# Patient Record
Sex: MALE | Race: WHITE
[De-identification: names, ages, dates, MRNs, and addresses within clinical notes are randomized per-mention and may not be internally consistent; named-entity substitution may affect disease eponyms.]

---

## 2017-08-01 ENCOUNTER — HOSPITAL ENCOUNTER (EMERGENCY)
Dept: HOSPITAL 43 - DL.ED | Age: 82
Discharge: HOME | End: 2017-08-01
Payer: MEDICARE

## 2017-08-01 VITALS — SYSTOLIC BLOOD PRESSURE: 105 MMHG | DIASTOLIC BLOOD PRESSURE: 56 MMHG

## 2017-08-01 DIAGNOSIS — I35.9: ICD-10-CM

## 2017-08-01 DIAGNOSIS — J90: ICD-10-CM

## 2017-08-01 DIAGNOSIS — Z95.2: ICD-10-CM

## 2017-08-01 DIAGNOSIS — R06.00: Primary | ICD-10-CM

## 2017-08-01 DIAGNOSIS — Z79.01: ICD-10-CM

## 2017-08-01 DIAGNOSIS — I48.2: ICD-10-CM

## 2017-08-01 DIAGNOSIS — I36.1: ICD-10-CM

## 2017-08-01 DIAGNOSIS — Z91.018: ICD-10-CM

## 2017-08-01 DIAGNOSIS — Z79.899: ICD-10-CM

## 2017-08-01 DIAGNOSIS — I34.0: ICD-10-CM

## 2017-08-01 DIAGNOSIS — I42.9: ICD-10-CM

## 2017-08-01 DIAGNOSIS — I50.9: ICD-10-CM

## 2017-08-01 LAB
CHLORIDE SERPL-SCNC: 102 MMOL/L (ref 101–111)
SODIUM SERPL-SCNC: 140 MMOL/L (ref 135–145)

## 2017-08-01 PROCEDURE — 93306 TTE W/DOPPLER COMPLETE: CPT

## 2017-08-01 PROCEDURE — 71010: CPT

## 2017-08-01 PROCEDURE — 99284 EMERGENCY DEPT VISIT MOD MDM: CPT

## 2017-08-01 PROCEDURE — 85025 COMPLETE CBC W/AUTO DIFF WBC: CPT

## 2017-08-01 PROCEDURE — 99285 EMERGENCY DEPT VISIT HI MDM: CPT

## 2017-08-01 PROCEDURE — 36415 COLL VENOUS BLD VENIPUNCTURE: CPT

## 2017-08-01 PROCEDURE — 93010 ELECTROCARDIOGRAM REPORT: CPT

## 2017-08-01 PROCEDURE — 83880 ASSAY OF NATRIURETIC PEPTIDE: CPT

## 2017-08-01 PROCEDURE — 83735 ASSAY OF MAGNESIUM: CPT

## 2017-08-01 PROCEDURE — 80162 ASSAY OF DIGOXIN TOTAL: CPT

## 2017-08-01 PROCEDURE — 82553 CREATINE MB FRACTION: CPT

## 2017-08-01 PROCEDURE — 93005 ELECTROCARDIOGRAM TRACING: CPT

## 2017-08-01 PROCEDURE — 80053 COMPREHEN METABOLIC PANEL: CPT

## 2017-08-01 PROCEDURE — 82150 ASSAY OF AMYLASE: CPT

## 2017-08-01 PROCEDURE — 83690 ASSAY OF LIPASE: CPT

## 2017-08-01 PROCEDURE — 96374 THER/PROPH/DIAG INJ IV PUSH: CPT

## 2017-08-01 PROCEDURE — 85610 PROTHROMBIN TIME: CPT

## 2017-08-01 PROCEDURE — 84484 ASSAY OF TROPONIN QUANT: CPT

## 2017-08-01 NOTE — EDM.PDOC
ED HPI GENERAL MEDICAL PROBLEM





- General


Chief Complaint: Chest Pain


Stated Complaint: SOB, CHEST PAINS, [id number]


Time Seen by Provider: 08/01/17 20:25


Source of Information: Reports: Patient


History Limitations: Reports: No Limitations





- History of Present Illness


INITIAL COMMENTS - FREE TEXT/NARRATIVE: 





ED ambulatory with c/o increased SOB over past month. Admits some noncompliance 

with 3rd dose of lasix, but has been taking three times daily for past 2 days, 

Lower extremity edema improved. SOB worse when flat, with activity and 

conversation. Patient had spoken with PCP Dr. Jovani vincent and was 

told to come to ED.  No chest pain, Patient hx cardiomyopathy, chronic a fib, 

hx CHF, Aortic valve replacement. 


Onset: Gradual


Duration: Week(s): (4)


Severity: Mild


Associated Symptoms: Reports: Shortness of Breath.  Denies: Chest Pain, Cough, 

cough w sputum, Loss of Appetite, Weakness





- Related Data


 Allergies











Allergy/AdvReac Type Severity Reaction Status Date / Time


 


aspartame Allergy  Hives Verified 08/01/17 20:29


 


peanut Allergy  Hives Verified 08/01/17 20:28











Home Meds: 


 Home Meds





Carvedilol [Carvedilol] 25 mg PO BID 06/03/15 [History]


Digoxin [Digox] 125 mcg PO DAILY 06/03/15 [History]


Furosemide [Furosemide] 40 mg PO BID 06/03/15 [History]


Lisinopril [Lisinopril] 40 mg PO DAILY 06/03/15 [History]


Potassium 99 mg PO DAILY 06/03/15 [History]


Warfarin [Coumadin] 2.5 mg PO ASDIRECTED 06/03/15 [History]


amLODIPine [Norvasc] 5 mg PO DAILY 06/03/15 [History]











Past Medical History


Cardiovascular History: Reports: Heart Failure, Other (See Below)


Musculoskeletal History: Reports: Arthritis


Neurological History: Reports: Other (See Below)


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


Oncologic (Cancer) History: Reports: Other (See Below)


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





- Past Surgical History


Cardiovascular Surgical History: Reports: Other (See Below)


Musculoskeletal Surgical History: Reports: Other (See Below)





Social & Family History





- Family History


Family Medical History: Noncontributory





- Tobacco Use


Smoking Status *Q: Never Smoker


Years of Tobacco use: 50


Used Tobacco, but Quit: Yes


Month Tobacco Last Used: 75


Second Hand Smoke Exposure: No





- Caffeine Use


Caffeine Use: Reports: None





- Alcohol Use


Days Per Week of Alcohol Use: 0





- Recreational Drug Use


Recreational Drug Use: No





- Living Situation & Occupation


Living situation: Reports: , with Spouse


Occupation: Retired





ED ROS GENERAL





- Review of Systems


Review Of Systems: See Below


Constitutional: Reports: No Symptoms


HEENT: Reports: No Symptoms


Respiratory: Reports: Shortness of Breath.  Denies: Cough, Sputum


Cardiovascular: Reports: Dyspnea on Exertion, Edema, Orthopnea.  Denies: Chest 

Pain, Palpitations


Endocrine: Reports: No Symptoms


GI/Abdominal: Reports: No Symptoms


: Reports: No Symptoms


Musculoskeletal: Reports: No Symptoms


Skin: Reports: No Symptoms


Neurological: Reports: No Symptoms





ED EXAM, GENERAL





- Physical Exam


Exam: See Below


Exam Limited By: No Limitations


General Appearance: Alert, Moderate Distress


Eye Exam: Bilateral Eye: EOMI, PERRL


Ears: Normal External Exam, Hearing Loss (mild)


Nose: Normal Inspection


Throat/Mouth: Normal Inspection


Head: Atraumatic


Neck: Limited Range of Motion


Respiratory/Chest: Decreased Breath Sounds (left)


Cardiovascular: Normal Peripheral Pulses, Irregularly Irregular, Other (

swooshing murmur).  No: No Edema (2+ to knees)


GI/Abdominal: Normal Bowel Sounds, Soft


Back Exam: Normal Inspection


Extremities: Pedal Edema


Neurological: Alert, Oriented, Normal Cognition


Psychiatric: Normal Affect


Skin Exam: Warm, Dry, Intact, Normal Color





Course





- Vital Signs


Last Recorded V/S: 


 Last Vital Signs











Temp  98.2 F   08/01/17 21:42


 


Pulse  68   08/01/17 21:42


 


Resp  20   08/01/17 21:42


 


BP  105/56 L  08/01/17 21:42


 


Pulse Ox  93 L  08/01/17 21:42














- Orders/Labs/Meds


Orders: 


 Active Orders 24 hr











 Category Date Time Status


 


 EKG 12 Lead [EKG Documentation Completion] [RC] URGENT Care  08/01/17 20:11 

Active











Labs: 


 Laboratory Tests











  08/01/17 08/01/17 08/01/17 Range/Units





  20:18 20:18 20:18 


 


WBC  9.0    (5.0-10.0)  10^3/uL


 


RBC  4.86    (4.6-6.2)  10^6/uL


 


Hgb  15.7    (14.0-18.0)  g/dL


 


Hct  45.9    (40.0-54.0)  %


 


MCV  94.4    ()  fL


 


MCH  32.3    (27.0-34.0)  pg


 


MCHC  34.2    (33.0-35.0)  g/dL


 


Plt Count  173    (150-450)  10^3/uL


 


Neut % (Auto)  63.9    (42.2-75.2)  %


 


Lymph % (Auto)  19.0 L    (20.5-50.1)  %


 


Mono % (Auto)  12.1 H    (2-8)  %


 


Eos % (Auto)  4.2 H    (1.0-3.0)  %


 


Baso % (Auto)  0.8    (0.0-1.0)  %


 


PT     (9.0-12.0)  SEC


 


INR     (0.9-1.2)  


 


Sodium   140   (135-145)  mmol/L


 


Potassium   4.0   (3.6-5.0)  mmol/L


 


Chloride   102   (101-111)  mmol/L


 


Carbon Dioxide   29.0   (21.0-31.0)  mmol/L


 


Anion Gap   13.0   


 


BUN   17   (7-18)  mg/dL


 


Creatinine   0.9   (0.6-1.3)  mg/dL


 


Est Cr Clr Drug Dosing   66.55   mL/min


 


Estimated GFR (MDRD)   > 60   


 


BUN/Creatinine Ratio   18.88   


 


Glucose   105   ()  mg/dL


 


Calcium   8.0 L   (8.4-10.2)  mg/dl


 


Magnesium   2.0   (1.8-2.5)  mg/dL


 


Total Bilirubin   0.7   (0.2-1.0)  mg/dL


 


AST   39   (10-42)  IU/L


 


ALT   29   (10-60)  IU/L


 


Alkaline Phosphatase   111   ()  IU/L


 


CK-MB (CK-2)    2.60  (0.4-4.7)  ng/mL


 


Troponin I   0.02   (0.00-0.02)  ng/ml


 


B-Natriuretic Peptide   194 H   (0-100)  pg/ml


 


Total Protein   4.9 L   (6.7-8.2)  g/dl


 


Albumin   2.6 L   (3.2-5.5)  g/dl


 


Globulin   2.3   


 


Albumin/Globulin Ratio   1.13   


 


Amylase   68   ()  U/L


 


Lipase   36   (22-51)  U/L


 


Digoxin     (0-2.5)  ng/ml














  08/01/17 08/01/17 Range/Units





  20:18 20:18 


 


WBC    (5.0-10.0)  10^3/uL


 


RBC    (4.6-6.2)  10^6/uL


 


Hgb    (14.0-18.0)  g/dL


 


Hct    (40.0-54.0)  %


 


MCV    ()  fL


 


MCH    (27.0-34.0)  pg


 


MCHC    (33.0-35.0)  g/dL


 


Plt Count    (150-450)  10^3/uL


 


Neut % (Auto)    (42.2-75.2)  %


 


Lymph % (Auto)    (20.5-50.1)  %


 


Mono % (Auto)    (2-8)  %


 


Eos % (Auto)    (1.0-3.0)  %


 


Baso % (Auto)    (0.0-1.0)  %


 


PT  21.5 H   (9.0-12.0)  SEC


 


INR  2.1 H   (0.9-1.2)  


 


Sodium    (135-145)  mmol/L


 


Potassium    (3.6-5.0)  mmol/L


 


Chloride    (101-111)  mmol/L


 


Carbon Dioxide    (21.0-31.0)  mmol/L


 


Anion Gap    


 


BUN    (7-18)  mg/dL


 


Creatinine    (0.6-1.3)  mg/dL


 


Est Cr Clr Drug Dosing    mL/min


 


Estimated GFR (MDRD)    


 


BUN/Creatinine Ratio    


 


Glucose    ()  mg/dL


 


Calcium    (8.4-10.2)  mg/dl


 


Magnesium    (1.8-2.5)  mg/dL


 


Total Bilirubin    (0.2-1.0)  mg/dL


 


AST    (10-42)  IU/L


 


ALT    (10-60)  IU/L


 


Alkaline Phosphatase    ()  IU/L


 


CK-MB (CK-2)    (0.4-4.7)  ng/mL


 


Troponin I    (0.00-0.02)  ng/ml


 


B-Natriuretic Peptide    (0-100)  pg/ml


 


Total Protein    (6.7-8.2)  g/dl


 


Albumin    (3.2-5.5)  g/dl


 


Globulin    


 


Albumin/Globulin Ratio    


 


Amylase    ()  U/L


 


Lipase    (22-51)  U/L


 


Digoxin   0.6  (0-2.5)  ng/ml











Meds: 


Medications














Discontinued Medications














Generic Name Dose Route Start Last Admin





  Trade Name Phoebe  PRN Reason Stop Dose Admin


 


Furosemide  40 mg  08/01/17 20:53  08/01/17 21:00





  Lasix  IVPUSH  08/01/17 20:54  40 mg





  NOW ONE   Administration














- Re-Assessments/Exams


Free Text/Narrative Re-Assessment/Exam: 








Symptoms improved following Lasix IV and 1500ml diuresis. Talking full 

sentences without noted obvious dyspnea.  Discussed potential admission or 

transfer with patient for further management of pulmonary edema. Patient 

refused admission or transfer. Agreeable to follow with PCP in am.


Wife present during recommendations.  





Departure





- Departure


Time of Disposition: 22:35


Disposition: Home, Self-Care 01


Condition: Fair


Clinical Impression: 


 Dyspnea, Cardiomyopathy, Chronic atrial fibrillation, Pleural effusion





Forms:  ED Department Discharge


Additional Instructions: 


Take lasix as directed three times daily


follow up with Dr Awad's office in am and with Dr. Hahn


Urgent return to ER if increased difficulty breathing or chest pain








- My Orders


Last 24 Hours: 


My Active Orders





08/01/17 20:11


EKG 12 Lead [EKG Documentation Completion] [RC] URGENT 














- Assessment/Plan


Last 24 Hours: 


My Active Orders





08/01/17 20:11


EKG 12 Lead [EKG Documentation Completion] [RC] URGENT

## 2017-08-09 NOTE — EKG
08/01/2017- CHANTELLE RICO -

 

EKG per my reading shows atrial fibrillation with inferior Q-waves.

 

DD:  08/08/2017 19:41:59

DT:  08/08/2017 19:48:11

[facility]

Job #:  [id number]

## 2017-12-14 ENCOUNTER — HOSPITAL ENCOUNTER (INPATIENT)
Dept: HOSPITAL 43 - DL.ED | Age: 82
LOS: 3 days | Discharge: HOME | DRG: 292 | End: 2017-12-17
Attending: FAMILY MEDICINE | Admitting: FAMILY MEDICINE
Payer: MEDICARE

## 2017-12-14 DIAGNOSIS — I48.0: ICD-10-CM

## 2017-12-14 DIAGNOSIS — I35.9: ICD-10-CM

## 2017-12-14 DIAGNOSIS — I42.9: ICD-10-CM

## 2017-12-14 DIAGNOSIS — Z79.01: ICD-10-CM

## 2017-12-14 DIAGNOSIS — G47.33: ICD-10-CM

## 2017-12-14 DIAGNOSIS — Z79.899: ICD-10-CM

## 2017-12-14 DIAGNOSIS — Z95.2: ICD-10-CM

## 2017-12-14 DIAGNOSIS — I50.23: ICD-10-CM

## 2017-12-14 DIAGNOSIS — D49.6: ICD-10-CM

## 2017-12-14 DIAGNOSIS — I10: ICD-10-CM

## 2017-12-14 DIAGNOSIS — E88.09: ICD-10-CM

## 2017-12-14 DIAGNOSIS — I50.9: Primary | ICD-10-CM

## 2017-12-14 DIAGNOSIS — I48.91: ICD-10-CM

## 2017-12-14 DIAGNOSIS — D69.6: ICD-10-CM

## 2017-12-14 DIAGNOSIS — J90: ICD-10-CM

## 2017-12-14 DIAGNOSIS — E78.5: ICD-10-CM

## 2017-12-14 LAB
CHLORIDE SERPL-SCNC: 105 MMOL/L (ref 101–111)
SODIUM SERPL-SCNC: 139 MMOL/L (ref 135–145)

## 2017-12-14 PROCEDURE — 96374 THER/PROPH/DIAG INJ IV PUSH: CPT

## 2017-12-14 PROCEDURE — 85025 COMPLETE CBC W/AUTO DIFF WBC: CPT

## 2017-12-14 PROCEDURE — 36415 COLL VENOUS BLD VENIPUNCTURE: CPT

## 2017-12-14 PROCEDURE — 84484 ASSAY OF TROPONIN QUANT: CPT

## 2017-12-14 PROCEDURE — 96376 TX/PRO/DX INJ SAME DRUG ADON: CPT

## 2017-12-14 PROCEDURE — P9047 ALBUMIN (HUMAN), 25%, 50ML: HCPCS

## 2017-12-14 PROCEDURE — 99285 EMERGENCY DEPT VISIT HI MDM: CPT

## 2017-12-14 PROCEDURE — 80162 ASSAY OF DIGOXIN TOTAL: CPT

## 2017-12-14 PROCEDURE — 83880 ASSAY OF NATRIURETIC PEPTIDE: CPT

## 2017-12-14 PROCEDURE — 74000: CPT

## 2017-12-14 PROCEDURE — 71020: CPT

## 2017-12-14 PROCEDURE — 85610 PROTHROMBIN TIME: CPT

## 2017-12-14 PROCEDURE — 93010 ELECTROCARDIOGRAM REPORT: CPT

## 2017-12-14 PROCEDURE — 93005 ELECTROCARDIOGRAM TRACING: CPT

## 2017-12-14 PROCEDURE — 80053 COMPREHEN METABOLIC PANEL: CPT

## 2017-12-14 RX ADMIN — ALBUMIN HUMAN SCH MLS/HR: 0.25 SOLUTION INTRAVENOUS at 23:02

## 2017-12-14 NOTE — PCM.HP
H&P History of Present Illness





- General


Date of Service: 12/14/17


Admit Problem/Dx: 


 Admission Diagnosis/Problem





Admission Diagnosis/Problem      Acute congestive heart failure











- History of Present Illness


Initial Comments - Free Text/Narative: 


82-year-old male with past medical history of systolic congestive heart failure

, paroxysmal atrial fibrillation, obstructive sleep apnea, hypertension, 

hyperlipidemia, aortic valve disease, cerebral artery occlusion with cerebral 

infarction, thrombocytopenia present to the emergency room with complaint of 

shortness breath, lower extremities/scrotum/abdominal edema started 3-4 weeks 

ago and getting worse. Patient stated that he will get very winded if he climbs 

one flight of stairs also if he pains over to tie his shoes or walk fast to the 

bathroom. He denies chest pain, palpitation, heart racing, cough, hemoptysis, 

upper spirits symptoms, abdominal pain, diarrhea, constipation, blood in stool, 

black stool, urinary symptoms, any other symptoms or concerns. Patient stated 

that he had similar symptoms last August and his Lasix was increased to 40 mg 3 

times a day.





In emergency room laboratory workup reported unremarkable CBC except for 

platelet of 139 and INR 2.0. Troponin 0.03. . Sodium 139. Potassium 3.8. 

Creatinine 0.9. Total bilirubin 1.2. AST 40. AST 27. Alkaline phosphatase 103. 

Total protein 5.2. Albumin 2.6. Chest x-ray reported mild interstitial edema. 

Small left pleural effusion. Left lower lobe atelectasis versus consolidation. 

Abdominal x-ray reported no obstruction or free air. EKG shows atrial 

fibrillation with heart rate of 81. Patient was given 80 mg of Lasix in the 

emergency room.








- Related Data


Allergies/Adverse Reactions: 


 Allergies











Allergy/AdvReac Type Severity Reaction Status Date / Time


 


aspartame Allergy  Hives Verified 12/14/17 22:09


 


peanut Allergy  Hives Verified 12/14/17 22:09











Home Medications: 


 Home Meds





Carvedilol [Carvedilol] 25 mg PO BID 06/03/15 [History]


Digoxin [Digox] 125 mcg PO DAILY 06/03/15 [History]


Furosemide [Furosemide] 40 mg PO TID 06/03/15 [History]


Lisinopril [Lisinopril] 40 mg PO DAILY 06/03/15 [History]


Methylcellulose (with Sugar) [Citrucel] 454 gm PO DAILY 12/14/17 [History]


Potassium Chloride [Klor-Con M20] 20 meq PO DAILY 12/14/17 [History]


Warfarin [Coumadin] 2.5 mg PO ASDIRECTED 12/14/17 [History]


Warfarin [Coumadin] 5 mg PO ASDIRECTED 12/14/17 [History]











Past Medical History


Cardiovascular History: Reports: Afib, Heart Failure


Musculoskeletal History: Reports: Arthritis


Neurological History: Reports: Other (See Below)


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


Oncologic (Cancer) History: Reports: Other (See Below)


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





- Past Surgical History


Cardiovascular Surgical History: Reports: Valve Replacement





Social & Family History





- Family History


Family Medical History: Noncontributory





- Tobacco Use


Smoking Status *Q: Never Smoker


Years of Tobacco use: 50


Used Tobacco, but Quit: Yes


Month Tobacco Last Used: 75


Second Hand Smoke Exposure: No





- Caffeine Use


Caffeine Use: Reports: None





- Alcohol Use


Days Per Week of Alcohol Use: 0





- Recreational Drug Use


Recreational Drug Use: No





- Living Situation & Occupation


Living situation: Reports: , with Spouse


Occupation: Retired





H&P Review of Systems





- Review of Systems:


Review Of Systems: ROS reveals no pertinent complaints other than HPI.





Exam





- Exam


Exam: See Below





- Vital Signs


Vital Signs: 


 Last Vital Signs











Temp  36.6 C   12/14/17 22:18


 


Pulse  64   12/14/17 22:18


 


Resp  20   12/14/17 22:18


 


BP  105/59 L  12/14/17 22:18


 


Pulse Ox  93 L  12/14/17 22:18











Weight: 92.986 kg





- Exam


General: Alert, Oriented, Cooperative, Mild Distress.  No: Moderate Distress, 

Severe Distress, Sedated, Lethargic, Obtunded


HEENT: Conjunctiva Clear, EACs Clear, EOMI, Hearing Intact, Mucosa Moist & Pink

, Nares Patent, Normal Nasal Septum, Posterior Pharynx Clear, Pupils Equal, 

Pupils Reactive, TMs Clear


Neck: Supple, Trachea Midline


Lungs: Decreased Breath Sounds (Mostly basis), Crackles (In basis).  No: Rhonchi

, Rub, Stridor, Wheezing


Cardiovascular: Normal S1, Normal S2, Irregular Rhythm


GI/Abdominal Exam: Normal Bowel Sounds, Soft, Non-Tender, No Organomegaly, No 

Abnormal Bruit, No Mass, Distended.  No: Guarding, Rigid, Rebound, Tender, 

Hepatomegaly, Splenomegaly


 (Male) Exam: Deferred


Rectal (Males) Exam: Deferred


Back Exam: Normal Inspection, Full Range of Motion.  No: CVA Tenderness (L), 

CVA Tenderness (R)


Extremities: Normal Range of Motion, Non-Tender, Normal Capillary Refill, Other 

(+3 bilateral lower extremities edema from ankle to upper thigh.)


Peripheral Pulses: 2+: Radial (L), Radial (R)


Skin: Dry, Intact.  No: Petechia


Neurological: Cranial Nerves Intact, Reflexes Equal Bilateral


Neuro Extensive - Mental Status: Alert, Oriented x3, Normal Cognition


Psychiatric: Alert, Normal Affect, Normal Mood





- Patient Data


Result Diagrams: 


 12/14/17 19:50





 12/14/17 19:50





*Q Meaningful Use (ADM)





- VTE *Q


VTE Criteria *Q: 








- Stroke *Q


Stroke Criteria *Q: 








- AMI *Q


AMI Criteria *Q: 








- Problem List


(1) Acute on chronic systolic (congestive) heart failure


SNOMED Code(s): 499202978


   ICD Code: I50.23 - ACUTE ON CHRONIC SYSTOLIC (CONGESTIVE) HEART FAILURE   

Status: Acute   Priority: High   Current Visit: Yes   





(2) Hypoalbuminemia


SNOMED Code(s): 324039641


   ICD Code: E88.09 - OTH DISORDERS OF PLASMA-PROTEIN METABOLISM, NEC   Status: 

Chronic   Current Visit: Yes   





(3) Thrombocytopenia


SNOMED Code(s): 695425850


   ICD Code: D69.6 - THROMBOCYTOPENIA, UNSPECIFIED   Status: Chronic   Current 

Visit: Yes   





(4) Essential hypertension


SNOMED Code(s): 19241698


   ICD Code: I10 - ESSENTIAL (PRIMARY) HYPERTENSION   Status: Chronic   Current 

Visit: Yes   





(5) Aortic valve disease


SNOMED Code(s): 9734906


   ICD Code: I35.9 - NONRHEUMATIC AORTIC VALVE DISORDER, UNSPECIFIED   Status: 

Chronic   Current Visit: Yes   





(6) Chronic atrial fibrillation


SNOMED Code(s): 643575484


   ICD Code: I48.2 - CHRONIC ATRIAL FIBRILLATION   Status: Chronic   Current 

Visit: Yes   





(7) Pleural effusion


SNOMED Code(s): 01090234


   ICD Code: J90 - PLEURAL EFFUSION, NOT ELSEWHERE CLASSIFIED   Status: Chronic

   Current Visit: Yes   


Problem List Initiated/Reviewed/Updated: Yes


Orders Last 24hrs: 


 Active Orders 24 hr











 Category Date Time Status


 


 Patient Status [ADT] Routine ADT  12/14/17 22:17 Active


 


 Antiembolic Devices [RC] PER UNIT ROUTINE Care  12/14/17 22:22 Ordered


 


 Bedrest Bathroom Privileges [RC] ASDIRECTED Care  12/14/17 22:16 Ordered


 


 Cardiac Monitoring [RC] CONTINUOUS Care  12/14/17 22:21 Ordered


 


 Height and Weight [RC] DAILY Care  12/14/17 22:16 Ordered


 


 Intake and Output [RC] Q6H Care  12/14/17 22:20 Ordered


 


 Notify Provider Vital Signs [RC] ASDIRECTED Care  12/14/17 22:21 Ordered


 


 Oxygen Therapy [RC] PRN Care  12/14/17 22:17 Ordered


 


 VTE/DVT Education [RC] PER UNIT ROUTINE Care  12/14/17 22:17 Ordered


 


 Vital Signs [RC] Q4H Care  12/14/17 22:17 Ordered


 


 2 Gram Sodium Diet [DIET] Diet  12/14/17 Breakfast Active


 


 CBC WITH AUTO DIFF [HEME] AM Lab  12/15/17 05:11 Ordered


 


 COMPREHENSIVE METABOLIC PN,CMP [CHEM] AM Lab  12/15/17 05:11 Ordered


 


 INR,PT,PROTHROMBIN TIME [COAG] AM Lab  12/15/17 05:11 Ordered


 


 MAGNESIUM [CHEM] AM Lab  12/15/17 05:11 Ordered


 


 TROPONIN I [CHEM] AM Lab  12/15/17 05:11 Ordered


 


 Acetaminophen [Tylenol] Med  12/14/17 22:16 Ordered





 650 mg PO Q4H PRN   


 


 Albumin 25% [Flexbumin 25%] Med  12/14/17 22:15 Ordered





 12.5 gm in 50 ml IV Q8H   


 


 Carvedilol [Coreg] Med  12/15/17 09:00 Ordered





 25 mg PO BID   


 


 Digoxin [Lanoxin] Med  12/15/17 09:00 Ordered





 125 mcg PO DAILY   


 


 Furosemide [Lasix] Med  12/15/17 06:00 Ordered





 40 mg IVPUSH Q8H   


 


 Lisinopril [Lisinopril] Med  12/15/17 09:00 Ordered





 40 mg PO DAILY   


 


 Methylcellulose (with Sugar) [Citrucel] Med  12/15/17 09:00 Ordered





 454 gm PO DAILY   


 


 Ondansetron [Zofran] Med  12/14/17 22:16 Ordered





 4 mg IVPUSH Q6H PRN   


 


 Polyethylene Glycol 3350 [MiraLAX] Med  12/14/17 22:16 Ordered





 17 gm PO DAILY PRN   


 


 Potassium Chloride [Klor-Con 10] Med  12/15/17 06:00 Ordered





 10 meq PO TIDMEALS   


 


 Sodium Chloride 0.9% [Saline Flush] Med  12/14/17 22:16 Ordered





 10 ml FLUSH ASDIRECTED PRN   


 


 Warfarin Pharmacy to Dose [Pharmacy to Dose - Warfarin] Med  12/14/17 22:30 

Ordered





 1 dose .XX ASDIRECTED   


 


 Warfarin [Coumadin] Med  12/14/17 22:15 Ordered





 2.5 mg PO ASDIRECTED   


 


 Warfarin [Coumadin] Med  12/14/17 22:15 Ordered





 5 mg PO ASDIRECTED   


 


 Antiembolic Hose [OM.PC] Per Unit Routine Oth  12/14/17 22:21 Ordered


 


 Saline Lock Insert [OM.PC] Routine Oth  12/14/17 22:16 Ordered


 


 Resuscitation Status Routine Resus Stat  12/14/17 22:16 Ordered








 Medication Orders





Carvedilol (Coreg)  25 mg PO BID JOHNNIE


Digoxin (Lanoxin)  125 mcg PO DAILY JOHNNIE


Furosemide (Lasix)  40 mg IVPUSH Q8H JOHNNIE


Albumin Human (Flexbumin 25%)  12.5 gm in 50 mls @ 100 mls/hr IV Q8H JOHNNIE


   Stop: 12/16/17 22:16


Non-Formulary Medication (Lisinopril [Lisinopril])  40 mg PO DAILY [facility]


Non-Formulary Medication (Methylcellulose (With Sugar) [Citrucel])  454 gm PO 

DAILY JOHNNIE


Potassium Chloride (Klor-Con 10)  10 meq PO TIDMEALS JOHNNIE


Warfarin Sodium (Coumadin)  2.5 mg PO ASDIRECTED JOHNNIE


Warfarin Sodium (Coumadin)  5 mg PO ASDIRECTED JOHNNIE


Warfarin Sodium (Pharmacy To Dose - Warfarin)  1 dose .XX ASDIRECTED JOHNNIE








Assessment/Plan Comment:: 





Impression


82-year-old female with a history of congestive heart failure, atrial 

fibrillation presented with anasarca, possible ascites, pulmonary edema. His 

albumin is low. I reviewed his echocardiogram report from 8/1/17 Which Reported 

Ejection Fraction 45-50% with Mild Global Hypokinesia.





Plan


Patient received 80 mg of Lasix IV in the emergency room and he gave good urine 

output


-Lasix 40 mg IV every 8 hours


-Potassium chloride 10 mEq every 8 hours while on IV potassium


-Albumin 25% every 8 hours for 2 days


-We'll consider starting spironolactone after his acute event improved


-I and os and daily weight


-Continue digoxin, lisinopril, carvedilol


-Pharmacy is consulted for warfarin dosing


-Repeat labs in the morning





Patient is on warfarin and INR is therapeutic so no need for further DVT 

prophylaxis





Patient wants to be full code





Plan of care was discussed with patient and he verbalized understanding agreed 

with it.

## 2017-12-14 NOTE — EDM.PDOC
ED HPI GENERAL MEDICAL PROBLEM





- General


Chief Complaint: Cardiovascular Problem


Stated Complaint: [id number] EDEMA TOLD BY DR CASTRO TO COME


Time Seen by Provider: 12/14/17 19:40


Source of Information: Reports: Patient, Family


History Limitations: Reports: No Limitations





- History of Present Illness


INITIAL COMMENTS - FREE TEXT/NARRATIVE: 





ED with c/o gradual increasing SOB and weight gain. Notes baseline is 200, 

today 207#. No chest pain. Similar sx in august and IV lasix improved symptoms, 

Stated he was recommended to stay but did not. Activity tolerance decreased. 

Unable to bend over and tie shoes as feels abdomen also full of fluid and 

"pushing everything up when bends over. Edema to legs increasing, now up above 

knees. Hx atrial fib. 


Onset: Gradual





- Related Data


 Allergies











Allergy/AdvReac Type Severity Reaction Status Date / Time


 


aspartame Allergy  Hives Verified 12/14/17 19:30


 


peanut Allergy  Hives Verified 12/14/17 19:30











Home Meds: 


 Home Meds





Carvedilol [Carvedilol] 25 mg PO BID 06/03/15 [History]


Digoxin [Digox] 125 mcg PO DAILY 06/03/15 [History]


Furosemide [Furosemide] 40 mg PO TID 06/03/15 [History]


Lisinopril [Lisinopril] 40 mg PO DAILY 06/03/15 [History]


Warfarin [Coumadin] 2.5 mg PO ASDIRECTED 06/03/15 [History]


Potassium Chloride [Klor-Con M20] 20 meq PO DAILY 12/14/17 [History]











Past Medical History


Cardiovascular History: Reports: Afib, Heart Failure


Musculoskeletal History: Reports: Arthritis


Neurological History: Reports: Other (See Below)


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


Oncologic (Cancer) History: Reports: Other (See Below)


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





- Past Surgical History


Cardiovascular Surgical History: Reports: Valve Replacement





Social & Family History





- Family History


Family Medical History: Noncontributory





- Tobacco Use


Smoking Status *Q: Never Smoker


Years of Tobacco use: 50


Used Tobacco, but Quit: Yes


Month Tobacco Last Used: 75


Second Hand Smoke Exposure: No





- Caffeine Use


Caffeine Use: Reports: None





- Alcohol Use


Days Per Week of Alcohol Use: 0





- Recreational Drug Use


Recreational Drug Use: No





- Living Situation & Occupation


Living situation: Reports: , with Spouse


Occupation: Retired





ED ROS GENERAL





- Review of Systems


Review Of Systems: See Below


Constitutional: Reports: No Symptoms


HEENT: Reports: No Symptoms


Respiratory: Reports: Shortness of Breath


Cardiovascular: Reports: Edema.  Denies: Chest Pain


GI/Abdominal: Reports: Diarrhea, Distension


: Reports: No Symptoms


Musculoskeletal: Reports: No Symptoms


Skin: Reports: No Symptoms


Neurological: Reports: No Symptoms





ED EXAM, GENERAL





- Physical Exam


Exam: See Below


Exam Limited By: No Limitations


General Appearance: Alert, Mild Distress (dypnea with ambulation, minimal at 

rest, talking full sentences)


Eye Exam: Bilateral Eye: EOMI


Ears: Normal External Exam, Normal TMs


Nose: Normal Inspection


Throat/Mouth: Normal Inspection


Head: Atraumatic, Normocephalic


Neck: Normal Inspection


Respiratory/Chest: Decreased Breath Sounds, Crackles (fine left base).  No: 

Rhonchi, Wheezing


Cardiovascular: Normal Peripheral Pulses, Diastolic Murmur.  No: Regular Rate, 

Rhythm, No Edema (3+to knees bilaterally)


GI/Abdominal: Normal Bowel Sounds, Soft, Distended.  No: Tender


Back Exam: Normal Inspection


Extremities: Normal Range of Motion


Neurological: Alert, Oriented, Normal Cognition


Skin Exam: Warm, Dry, Intact, Normal Color





Course





- Vital Signs


Last Recorded V/S: 


 Last Vital Signs











Temp  95.7 F   12/14/17 19:25


 


Pulse  74   12/14/17 19:25


 


Resp  18   12/14/17 19:25


 


BP  145/89 H  12/14/17 19:25


 


Pulse Ox  93 L  12/14/17 19:25














- Orders/Labs/Meds


Orders: 


 Active Orders 24 hr











 Category Date Time Status


 


 EKG 12 Lead [EKG Documentation Completion] [RC] URGENT Care  12/14/17 20:22 

Active


 


 DIGOXIN [CHEM] Stat Lab  12/14/17 19:50 Received


 


 Furosemide [Lasix] Med  12/14/17 21:32 Once





 40 mg IVPUSH NOW ONE   











Labs: 


 Laboratory Tests











  12/14/17 12/14/17 12/14/17 Range/Units





  19:50 19:50 19:50 


 


WBC  7.9    (5.0-10.0)  10^3/uL


 


RBC  4.70    (4.6-6.2)  10^6/uL


 


Hgb  15.3    (14.0-18.0)  g/dL


 


Hct  45.2    (40.0-54.0)  %


 


MCV  96.2    ()  fL


 


MCH  32.6    (27.0-34.0)  pg


 


MCHC  33.8    (33.0-35.0)  g/dL


 


Plt Count  139 L    (150-450)  10^3/uL


 


Neut % (Auto)  65.0    (42.2-75.2)  %


 


Lymph % (Auto)  19.1 L    (20.5-50.1)  %


 


Mono % (Auto)  11.3 H    (2-8)  %


 


Eos % (Auto)  4.0 H    (1.0-3.0)  %


 


Baso % (Auto)  0.6    (0.0-1.0)  %


 


PT    20.6 H  (9.0-12.0)  SEC


 


INR    2.0 H  (0.9-1.2)  


 


Sodium   139   (135-145)  mmol/L


 


Potassium   3.8   (3.6-5.0)  mmol/L


 


Chloride   105   (101-111)  mmol/L


 


Carbon Dioxide   28.0   (21.0-31.0)  mmol/L


 


Anion Gap   9.8   


 


BUN   22 H   (7-18)  mg/dL


 


Creatinine   0.9   (0.6-1.3)  mg/dL


 


Est Cr Clr Drug Dosing   65.46   mL/min


 


Estimated GFR (MDRD)   > 60   


 


BUN/Creatinine Ratio   24.44   


 


Glucose   137 H   ()  mg/dL


 


Calcium   7.8 L   (8.4-10.2)  mg/dl


 


Total Bilirubin   1.2 H   (0.2-1.0)  mg/dL


 


AST   40   (10-42)  IU/L


 


ALT   27   (10-60)  IU/L


 


Alkaline Phosphatase   103   ()  IU/L


 


Troponin I   0.03 H*   (0.00-0.02)  ng/ml


 


B-Natriuretic Peptide   263 H   (0-100)  pg/ml


 


Total Protein   5.2 L   (6.7-8.2)  g/dl


 


Albumin   2.6 L   (3.2-5.5)  g/dl


 


Globulin   2.6   


 


Albumin/Globulin Ratio   1.00   











Meds: 


Medications














Discontinued Medications














Generic Name Dose Route Start Last Admin





  Trade Name Freq  PRN Reason Stop Dose Admin


 


Furosemide  40 mg  12/14/17 20:21  12/14/17 20:31





  Lasix  IVPUSH  12/14/17 20:22  40 mg





  NOW ONE   Administration














- Radiology Interpretation


Free Text/Narrative:: 





CXR, Left pleural effusion





- Re-Assessments/Exams


Free Text/Narrative Re-Assessment/Exam: 





12/14/17 21:34


TC Dr. Dial, agree to admit for further eval and management CHF





Departure





- Departure


Time of Disposition: 21:34


Disposition: Admitted As Inpatient 68


Condition: Good


Clinical Impression: 


 CHF, Congestive heart failure, Chronic atrial fibrillation, Pleural effusion





Cardiomyopathy


Qualifiers:


 Cardiomyopathy type: unspecified Qualified Code(s): I42.9 - Cardiomyopathy, 

unspecified





Dyspnea


Qualifiers:


 Dyspnea type: dyspnea on exertion Qualified Code(s): R06.09 - Other forms of 

dyspnea





Forms:  ED Department Discharge





- My Orders


Last 24 Hours: 


My Active Orders





12/14/17 19:50


DIGOXIN [CHEM] Stat 





12/14/17 20:22


EKG 12 Lead [EKG Documentation Completion] [RC] URGENT 





12/14/17 21:32


Furosemide [Lasix]   40 mg IVPUSH NOW ONE 














- Assessment/Plan


Last 24 Hours: 


My Active Orders





12/14/17 19:50


DIGOXIN [CHEM] Stat 





12/14/17 20:22


EKG 12 Lead [EKG Documentation Completion] [RC] URGENT 





12/14/17 21:32


Furosemide [Lasix]   40 mg IVPUSH NOW ONE

## 2017-12-15 LAB
CHLORIDE SERPL-SCNC: 106 MMOL/L (ref 101–111)
SODIUM SERPL-SCNC: 140 MMOL/L (ref 135–145)

## 2017-12-15 RX ADMIN — POTASSIUM CHLORIDE SCH MEQ: 750 TABLET, FILM COATED, EXTENDED RELEASE ORAL at 10:26

## 2017-12-15 RX ADMIN — ALBUMIN HUMAN SCH MLS/HR: 0.25 SOLUTION INTRAVENOUS at 14:17

## 2017-12-15 RX ADMIN — POTASSIUM CHLORIDE SCH: 750 TABLET, FILM COATED, EXTENDED RELEASE ORAL at 06:02

## 2017-12-15 RX ADMIN — POTASSIUM CHLORIDE SCH MEQ: 750 TABLET, FILM COATED, EXTENDED RELEASE ORAL at 14:13

## 2017-12-15 RX ADMIN — ALBUMIN HUMAN SCH MLS/HR: 0.25 SOLUTION INTRAVENOUS at 22:15

## 2017-12-15 RX ADMIN — ALBUMIN HUMAN SCH MLS/HR: 0.25 SOLUTION INTRAVENOUS at 06:17

## 2017-12-15 RX ADMIN — POTASSIUM CHLORIDE SCH MEQ: 750 TABLET, FILM COATED, EXTENDED RELEASE ORAL at 17:49

## 2017-12-15 NOTE — PCM.PN
- General Info


Date of Service: 12/15/17


Admission Dx/Problem (Free Text): 


 Admission Diagnosis/Problem





Admission Diagnosis/Problem      Acute congestive heart failure








Subjective Update: 





Patient stated that he is feeling better. His lower extremities edema and 

abdominal edema improved. His shortness breath improved. He denies chest pain, 

cough, palpitation, nausea, vomiting, fever, chills, any other symptoms or 

concerns.





- Patient Data


Vitals - Most Recent: 


 Last Vital Signs











Temp  36.9 C   12/15/17 07:00


 


Pulse  71   12/15/17 10:26


 


Resp  20   12/15/17 07:00


 


BP  119/68   12/15/17 10:27


 


Pulse Ox  94 L  12/15/17 07:00











Weight - Most Recent: 91.172 kg


I&O - Last 24 Hours: 


 Intake & Output











 12/14/17 12/15/17 12/15/17





 22:59 06:59 14:59


 


Intake Total 100 152 700


 


Output Total 5978 039 9310


 


Balance -1000 -023 300











Lab Results Last 24 Hours: 


 Laboratory Results - last 24 hr











  12/15/17 12/15/17 12/15/17 Range/Units





  05:45 05:45 05:45 


 


WBC  7.7    (5.0-10.0)  10^3/uL


 


RBC  4.40 L    (4.6-6.2)  10^6/uL


 


Hgb  14.4    (14.0-18.0)  g/dL


 


Hct  42.6    (40.0-54.0)  %


 


MCV  96.8    ()  fL


 


MCH  32.7    (27.0-34.0)  pg


 


MCHC  33.8    (33.0-35.0)  g/dL


 


Plt Count  134 L    (150-450)  10^3/uL


 


Neut % (Auto)  60.1    (42.2-75.2)  %


 


Lymph % (Auto)  21.8    (20.5-50.1)  %


 


Mono % (Auto)  13.3 H    (2-8)  %


 


Eos % (Auto)  4.3 H    (1.0-3.0)  %


 


Baso % (Auto)  0.5    (0.0-1.0)  %


 


PT   19.9 H   (9.0-12.0)  SEC


 


INR   2.0 H   (0.9-1.2)  


 


Sodium    140  (135-145)  mmol/L


 


Potassium    3.4 L  (3.6-5.0)  mmol/L


 


Chloride    106  (101-111)  mmol/L


 


Carbon Dioxide    29.0  (21.0-31.0)  mmol/L


 


Anion Gap    8.4  


 


BUN    22 H  (7-18)  mg/dL


 


Creatinine    1.0  (0.6-1.3)  mg/dL


 


Est Cr Clr Drug Dosing    58.92  mL/min


 


Estimated GFR (MDRD)    > 60  


 


BUN/Creatinine Ratio    22.00  


 


Glucose    93  ()  mg/dL


 


Calcium    7.7 L  (8.4-10.2)  mg/dl


 


Magnesium    1.9  (1.8-2.5)  mg/dL


 


Total Bilirubin    0.7  (0.2-1.0)  mg/dL


 


AST    33  (10-42)  IU/L


 


ALT    25  (10-60)  IU/L


 


Alkaline Phosphatase    98  ()  IU/L


 


Troponin I    0.03 H*  (0.00-0.02)  ng/ml


 


Total Protein    4.7 L  (6.7-8.2)  g/dl


 


Albumin    2.5 L  (3.2-5.5)  g/dl


 


Globulin    2.2  


 


Albumin/Globulin Ratio    1.14  











Med Orders - Current: 


 Current Medications





Acetaminophen (Tylenol)  650 mg PO Q4H PRN


   PRN Reason: Pain (Mild 1-3)/fever


Carvedilol (Coreg)  25 mg PO BID [facility]


   Last Admin: 12/15/17 10:26 Dose:  25 mg


Digoxin (Lanoxin)  125 mcg PO DAILY [facility]


   Last Admin: 12/15/17 10:25 Dose:  125 mcg


Furosemide (Lasix)  40 mg IVPUSH Q8H [facility]


   Last Admin: 12/15/17 06:13 Dose:  40 mg


Albumin Human (Flexbumin 25%)  12.5 gm in 50 mls @ 100 mls/hr IV Q8H [facility]


   Stop: 12/16/17 22:16


   Last Admin: 12/15/17 06:17 Dose:  100 mls/hr


Lisinopril (Prinivil)  40 mg PO DAILY [facility]


   Last Admin: 12/15/17 10:27 Dose:  40 mg


Methylcellulose (Citrucel)  500 mg PO DAILY [facility]


   Last Admin: 12/15/17 10:26 Dose:  500 mg


Ondansetron HCl (Zofran)  4 mg IVPUSH Q6H PRN


   PRN Reason: Nausea/Vomiting


Polyethylene Glycol (Miralax)  17 gm PO DAILY PRN


   PRN Reason: Constipation


Potassium Chloride (Klor-Con 10)  10 meq PO TIDMEALS [facility]


   Last Admin: 12/15/17 10:26 Dose:  10 meq


Sodium Chloride (Saline Flush)  10 ml FLUSH ASDIRECTED PRN


   PRN Reason: Keep Vein Open


Warfarin Sodium (Pharmacy To Dose - Warfarin)  1 dose .XX ASDIRECTED [facility]


Warfarin Sodium (Coumadin)  2.5 mg PO ONETIME ONE


   Stop: 12/15/17 14:01





Discontinued Medications





Furosemide (Lasix)  40 mg IVPUSH NOW ONE


   Stop: 12/14/17 20:22


   Last Admin: 12/14/17 20:31 Dose:  40 mg


Furosemide (Lasix)  40 mg IVPUSH NOW ONE


   Stop: 12/14/17 21:33


   Last Admin: 12/14/17 21:41 Dose:  40 mg


Potassium Chloride (Klor-Con 10)  10 meq PO ONETIME ONE


   Stop: 12/15/17 08:40


   Last Admin: 12/15/17 10:26 Dose:  10 meq


Warfarin Sodium (Coumadin)  2.5 mg PO ASDIRECTED [facility]


Warfarin Sodium (Coumadin)  5 mg PO ASDIRECTED [facility]











- Exam


General: Alert, Oriented, Cooperative, No Acute Distress.  No: Mild Distress, 

Moderate Distress, Severe Distress, Sedated, Lethargic, Obtunded


HEENT: Pupils Equal, Pupils Reactive, EOMI, Mucous Membr. Moist/Pink


Neck: Supple, Trachea Midline


Lungs: Normal Respiratory Effort, Crackles (In basis).  No: Rales, Rhonchi, Rub

, Stridor, Wheezing


GI/Abdominal Exam: Normal Bowel Sounds, Soft, Non-Tender, No Organomegaly, No 

Abnormal Bruit, Distended (Improved from yesterday)


 (Male) Exam: Deferred


Back Exam: Normal Inspection, Full Range of Motion.  No: CVA Tenderness (L), 

CVA Tenderness (R)


Extremities: Normal Inspection, Normal Range of Motion, Non-Tender, Normal 

Capillary Refill, Other (Improved lower extremities edema)


Skin: Dry, Intact


Neurological: No New Focal Deficit


Psy/Mental Status: Alert, Normal Affect, Normal Mood





- Problem List & Annotations


(1) Acute on chronic systolic (congestive) heart failure


SNOMED Code(s): 547931039


   Code(s): I50.23 - ACUTE ON CHRONIC SYSTOLIC (CONGESTIVE) HEART FAILURE   

Status: Acute   Priority: High   Current Visit: Yes   





(2) Hypoalbuminemia


SNOMED Code(s): 165383671


   Code(s): E88.09 - OTH DISORDERS OF PLASMA-PROTEIN METABOLISM, NEC   Status: 

Chronic   Current Visit: Yes   





(3) Thrombocytopenia


SNOMED Code(s): 861552253


   Code(s): D69.6 - THROMBOCYTOPENIA, UNSPECIFIED   Status: Chronic   Current 

Visit: Yes   





(4) Essential hypertension


SNOMED Code(s): 47493991


   Code(s): I10 - ESSENTIAL (PRIMARY) HYPERTENSION   Status: Chronic   Current 

Visit: Yes   





(5) Aortic valve disease


SNOMED Code(s): 9852035


   Code(s): I35.9 - NONRHEUMATIC AORTIC VALVE DISORDER, UNSPECIFIED   Status: 

Chronic   Current Visit: Yes   





(6) Chronic atrial fibrillation


SNOMED Code(s): 988542620


   Code(s): I48.2 - CHRONIC ATRIAL FIBRILLATION   Status: Chronic   Current 

Visit: Yes   





(7) Pleural effusion


SNOMED Code(s): 67937205


   Code(s): J90 - PLEURAL EFFUSION, NOT ELSEWHERE CLASSIFIED   Status: Chronic 

  Current Visit: Yes   





- Problem List Review


Problem List Initiated/Reviewed/Updated: Yes





- My Orders


Last 24 Hours: 


My Active Orders





12/15/17 14:00


Warfarin [Coumadin]   2.5 mg PO ONETIME ONE 





12/16/17 05:11


B-TYPE NATRIURETIC PEPTIDE,BNP [CHEM] AM 


BASIC METABOLIC PANEL,BMP [CHEM] AM 














- Plan


Plan:: 





Impression


82-year-old female with a history of congestive heart failure, atrial 

fibrillation presented with anasarca, possible ascites, pulmonary edema. His 

albumin is low. I reviewed his echocardiogram report from 8/1/17 Which Reported 

Ejection Fraction 45-50% with Mild Global Hypokinesia.





Urine output is 2300 mL since admission





Plan


Patient received 80 mg of Lasix IV in the emergency room and he gave good urine 

output


-Continue Lasix 40 mg IV every 8 hours


-Continue Potassium chloride 10 mEq every 8 hours while on IV potassium


-Albumin 25% every 8 hours for total of 2 days


-We'll consider starting spironolactone after his acute event improved


-I and os and daily weight


-Continue digoxin, lisinopril, carvedilol


-Pharmacy is consulted for warfarin dosing


-Repeat labs in the morning





Patient is on warfarin and INR is therapeutic so no need for further DVT 

prophylaxis





Patient wants to be full code





Plan of care was discussed with patient and he verbalized understanding agreed 

with it.

## 2017-12-15 NOTE — EKG
12/14/2017 - CHANTELLE RICO reviewed the EKG and agree with the machine's reading.

 

DD:  12/15/2017 11:08:29

DT:  12/15/2017 11:16:49

[facility]

Job #:  [id number]

## 2017-12-16 LAB
CHLORIDE SERPL-SCNC: 106 MMOL/L (ref 101–111)
SODIUM SERPL-SCNC: 139 MMOL/L (ref 135–145)

## 2017-12-16 RX ADMIN — Medication PRN ML: at 21:59

## 2017-12-16 RX ADMIN — POTASSIUM CHLORIDE SCH MEQ: 750 TABLET, FILM COATED, EXTENDED RELEASE ORAL at 14:59

## 2017-12-16 RX ADMIN — ALBUMIN HUMAN SCH MLS/HR: 0.25 SOLUTION INTRAVENOUS at 21:57

## 2017-12-16 RX ADMIN — ALBUMIN HUMAN SCH MLS/HR: 0.25 SOLUTION INTRAVENOUS at 14:48

## 2017-12-16 RX ADMIN — Medication PRN ML: at 06:23

## 2017-12-16 RX ADMIN — POTASSIUM CHLORIDE SCH: 750 TABLET, FILM COATED, EXTENDED RELEASE ORAL at 10:38

## 2017-12-16 RX ADMIN — ALBUMIN HUMAN SCH MLS/HR: 0.25 SOLUTION INTRAVENOUS at 06:24

## 2017-12-16 NOTE — PCM.PN
- General Info


Date of Service: 12/16/17


Admission Dx/Problem (Free Text): 


 Admission Diagnosis/Problem





Admission Diagnosis/Problem      Acute congestive heart failure








Subjective Update: 





Patient stated that he is feeling better. His lower extremities edema and 

abdominal edema continue to improved. His shortness breath improved. He denies 

chest pain, cough, palpitation, nausea, vomiting, fever, chills, any other 

symptoms or concerns.





- Patient Data


Vitals - Most Recent: 


 Last Vital Signs











Temp  37.2 C   12/16/17 07:00


 


Pulse  72   12/16/17 10:30


 


Resp  20   12/16/17 07:00


 


BP  125/80   12/16/17 10:30


 


Pulse Ox  94 L  12/16/17 07:00











Weight - Most Recent: 89.018 kg


I&O - Last 24 Hours: 


 Intake & Output











 12/15/17 12/16/17 12/16/17





 22:59 06:59 14:59


 


Intake Total 280  250


 


Output Total 1600 1175 750


 


Balance -0440 -7805 -500











Lab Results Last 24 Hours: 


 Laboratory Results - last 24 hr











  12/16/17 12/16/17 Range/Units





  05:50 05:50 


 


PT   19.6 H  (9.0-12.0)  SEC


 


INR   1.9 H  (0.9-1.2)  


 


Sodium  139   (135-145)  mmol/L


 


Potassium  3.9   (3.6-5.0)  mmol/L


 


Chloride  106   (101-111)  mmol/L


 


Carbon Dioxide  28.0   (21.0-31.0)  mmol/L


 


Anion Gap  8.9   


 


BUN  22 H   (7-18)  mg/dL


 


Creatinine  0.9   (0.6-1.3)  mg/dL


 


Est Cr Clr Drug Dosing  65.46   mL/min


 


Estimated GFR (MDRD)  > 60   


 


Glucose  99   ()  mg/dL


 


Calcium  8.2 L   (8.4-10.2)  mg/dl


 


B-Natriuretic Peptide  286 H   (0-100)  pg/ml











Med Orders - Current: 


 Current Medications





Acetaminophen (Tylenol)  650 mg PO Q4H PRN


   PRN Reason: Pain (Mild 1-3)/fever


Carvedilol (Coreg)  25 mg PO BID [facility]


   Last Admin: 12/16/17 10:30 Dose:  25 mg


Digoxin (Lanoxin)  125 mcg PO DAILY [facility]


   Last Admin: 12/16/17 10:27 Dose:  125 mcg


Albumin Human (Flexbumin 25%)  12.5 gm in 50 mls @ 100 mls/hr IV Q8H [facility]


   Stop: 12/16/17 22:16


   Last Admin: 12/16/17 06:24 Dose:  100 mls/hr


Lisinopril (Prinivil)  40 mg PO DAILY [facility]


   Last Admin: 12/16/17 10:29 Dose:  40 mg


Methylcellulose (Citrucel)  500 mg PO DAILY [facility]


   Last Admin: 12/16/17 10:24 Dose:  500 mg


Ondansetron HCl (Zofran)  4 mg IVPUSH Q6H PRN


   PRN Reason: Nausea/Vomiting


Polyethylene Glycol (Miralax)  17 gm PO DAILY PRN


   PRN Reason: Constipation


Potassium Chloride (Klor-Con 10)  10 meq PO Q12H [facility]


Sodium Chloride (Saline Flush)  10 ml FLUSH ASDIRECTED PRN


   PRN Reason: Keep Vein Open


   Last Admin: 12/16/17 06:23 Dose:  10 ml


Spironolactone (Aldactone)  12.5 mg PO DAILY [facility]


   Last Admin: 12/16/17 10:31 Dose:  12.5 mg


Torsemide (Demadex)  40 mg PO Q12H [facility]


Warfarin Sodium (Pharmacy To Dose - Warfarin)  1 dose .XX ASDIRECTED [facility]


Warfarin Sodium (Coumadin)  2.5 mg PO ONETIME ONE


   Stop: 12/16/17 14:01





Discontinued Medications





Furosemide (Lasix)  40 mg IVPUSH NOW ONE


   Stop: 12/14/17 20:22


   Last Admin: 12/14/17 20:31 Dose:  40 mg


Furosemide (Lasix)  40 mg IVPUSH NOW ONE


   Stop: 12/14/17 21:33


   Last Admin: 12/14/17 21:41 Dose:  40 mg


Furosemide (Lasix)  40 mg IVPUSH Q8H [facility]


   Last Admin: 12/16/17 06:20 Dose:  40 mg


Potassium Chloride (Klor-Con 10)  10 meq PO TIDMEALS [facility]


   Last Admin: 12/16/17 10:38 Dose:  Not Given


Potassium Chloride (Klor-Con 10)  10 meq PO ONETIME ONE


   Stop: 12/15/17 08:40


   Last Admin: 12/15/17 10:26 Dose:  10 meq


Torsemide (Demadex)  30 mg PO Q12H [facility]


   Stop: 12/16/17 14:00


Warfarin Sodium (Coumadin)  2.5 mg PO ASDIRECTED [facility]


Warfarin Sodium (Coumadin)  5 mg PO ASDIRECTED [facility]


Warfarin Sodium (Coumadin)  2.5 mg PO ONETIME ONE


   Stop: 12/15/17 14:01


   Last Admin: 12/15/17 14:13 Dose:  2.5 mg











- Exam


General: Alert, Oriented, Cooperative, No Acute Distress.  No: Moderate Distress

, Severe Distress, Sedated, Lethargic, Obtunded


HEENT: Pupils Equal, Pupils Reactive, EOMI, Mucous Membr. Moist/Pink


Neck: Supple, Trachea Midline, No JVD


Lungs: Normal Respiratory Effort, Crackles (In bases).  No: Rhonchi, Rub, 

Stridor, Wheezing


Cardiovascular: Regular Rate, Regular Rhythm


GI/Abdominal Exam: Normal Bowel Sounds, Soft, Non-Tender, No Organomegaly, No 

Abnormal Bruit, No Mass, Distended (Markedly improved from admission exam)


 (Male) Exam: Deferred


Back Exam: Normal Inspection, Full Range of Motion.  No: CVA Tenderness (L), 

CVA Tenderness (R)


Extremities: Normal Range of Motion, Non-Tender, Normal Capillary Refill, Other 

(Lower extremities edema improved markedly but still +2 bilaterally)


Skin: Dry, Intact


Neurological: No New Focal Deficit


Psy/Mental Status: Alert, Normal Affect, Normal Mood





- Problem List & Annotations


(1) Acute on chronic systolic (congestive) heart failure


SNOMED Code(s): 959386934


   Code(s): I50.23 - ACUTE ON CHRONIC SYSTOLIC (CONGESTIVE) HEART FAILURE   

Status: Acute   Priority: High   Current Visit: Yes   





(2) Hypoalbuminemia


SNOMED Code(s): 082409686


   Code(s): E88.09 - [facility] DISORDERS OF PLASMA-PROTEIN METABOLISM, NEC   Status: 

Chronic   Current Visit: Yes   





(3) Thrombocytopenia


SNOMED Code(s): 444051048


   Code(s): D69.6 - THROMBOCYTOPENIA, UNSPECIFIED   Status: Chronic   Current 

Visit: Yes   





(4) Essential hypertension


SNOMED Code(s): 91298058


   Code(s): I10 - ESSENTIAL (PRIMARY) HYPERTENSION   Status: Chronic   Current 

Visit: Yes   





(5) Aortic valve disease


SNOMED Code(s): 0476086


   Code(s): I35.9 - NONRHEUMATIC AORTIC VALVE DISORDER, UNSPECIFIED   Status: 

Chronic   Current Visit: Yes   





(6) Chronic atrial fibrillation


SNOMED Code(s): 961215931


   Code(s): I48.2 - CHRONIC ATRIAL FIBRILLATION   Status: Chronic   Current 

Visit: Yes   





(7) Pleural effusion


SNOMED Code(s): 36470567


   Code(s): J90 - PLEURAL EFFUSION, NOT ELSEWHERE CLASSIFIED   Status: Chronic 

  Current Visit: Yes   





- Problem List Review


Problem List Initiated/Reviewed/Updated: Yes





- My Orders


Last 24 Hours: 


My Active Orders





12/15/17 Lunch


Fluid Restriction [DIET] 





12/16/17 09:45


Spironolactone [Aldactone]   12.5 mg PO DAILY 





12/16/17 14:00


Potassium Chloride [Klor-Con 10]   10 meq PO Q12H 


Torsemide [Demadex]   40 mg PO Q12H 


Warfarin [Coumadin]   2.5 mg PO ONETIME ONE 





12/17/17 05:11


B-TYPE NATRIURETIC PEPTIDE,BNP [CHEM] AM 


BASIC METABOLIC PANEL,BMP [CHEM] AM 


MAGNESIUM [CHEM] AM 





12/17/17 06:00


INR,PT,PROTHROMBIN TIME [COAG] DAILY 





12/18/17 06:00


INR,PT,PROTHROMBIN TIME [COAG] DAILY 





12/19/17 06:00


INR,PT,PROTHROMBIN TIME [COAG] DAILY 





12/20/17 06:00


INR,PT,PROTHROMBIN TIME [COAG] DAILY 





12/21/17 06:00


INR,PT,PROTHROMBIN TIME [COAG] DAILY 





12/22/17 06:00


INR,PT,PROTHROMBIN TIME [COAG] DAILY 














- Plan


Plan:: 





Impression


82-year-old female with a history of congestive heart failure, atrial 

fibrillation presented with anasarca, possible ascites, pulmonary edema. His 

albumin is low. I reviewed his echocardiogram report from 8/1/17 Which Reported 

Ejection Fraction 45-50% with Mild Global Hypokinesia.





Urine output is adequate for the treatment


Patient lost about 10 pounds since admission





Plan


Patient received 80 mg of Lasix IV in the emergency room and he gave good urine 

output. Then he was started on Lasix 40 mg IV every 8 hours with albumin 25% 

every 8 hours for 2 days.


-I am changing his IV Lasix to oral torsemide 40 mg twice a day. There are some 

articles indicating that furosemide observation in the got could be inhibited 

there is abdomen/got edema


-I will add spironolactone 12.5 mg for his possible ascites


-Continue Potassium chloride 10 mEq supplement


-Fluid restriction to 1500 mL per hour


-I and os and daily weight


-Continue digoxin, lisinopril, carvedilol


-Pharmacy is consulted for warfarin dosing


-Repeat labs in the morning





Patient is on warfarin and INR is therapeutic so no need for further DVT 

prophylaxis





Patient wants to be full code





Plan of care was discussed with patient and he verbalized understanding agreed 

with it.

## 2017-12-17 VITALS — DIASTOLIC BLOOD PRESSURE: 86 MMHG | SYSTOLIC BLOOD PRESSURE: 133 MMHG

## 2017-12-17 LAB
CHLORIDE SERPL-SCNC: 103 MMOL/L (ref 101–111)
SODIUM SERPL-SCNC: 140 MMOL/L (ref 135–145)

## 2017-12-17 RX ADMIN — POTASSIUM CHLORIDE SCH MEQ: 750 TABLET, FILM COATED, EXTENDED RELEASE ORAL at 03:07

## 2017-12-17 NOTE — PCM.DCSUM1
**Discharge Summary





- Hospital Course


Free Text/Narrative:: 


82-year-old male with past medical history of systolic congestive heart failure

, paroxysmal atrial fibrillation, obstructive sleep apnea, hypertension, 

hyperlipidemia, aortic valve disease, cerebral artery occlusion with cerebral 

infarction, thrombocytopenia present to the emergency room with complaint of 

shortness breath, lower extremities/scrotum/abdominal edema started 3-4 weeks 

ago and getting worse. Patient stated that he was getting very winded if he 

climbs one flight of stairs also if he pains over to tie his shoes or walk fast 

to the bathroom. He gained a lot of pounds of weight in a few weeks. He denies 

chest pain, palpitation, heart racing, cough, hemoptysis, upper spirits symptoms

, abdominal pain, diarrhea, constipation, blood in stool, black stool, urinary 

symptoms, any other symptoms or concerns. Patient stated that he had similar 

symptoms last August and his Lasix was increased to 40 mg 3 times a day.





On admission laboratory workup reported unremarkable CBC except for platelet of 

139 and INR 2.0. Troponin 0.03. . Sodium 139. Potassium 3.8. Creatinine 

0.9. Total bilirubin 1.2. AST 40. AST 27. Alkaline phosphatase 103. Total 

protein 5.2. Albumin 2.6. INR 2. Chest x-ray reported mild interstitial edema. 

Small left pleural effusion. Left lower lobe atelectasis versus consolidation. 

Abdominal x-ray reported no obstruction or free air. EKG shows atrial 

fibrillation with heart rate of 81. Patient was given 80 mg of Lasix in the 

emergency room.





During hospitalization patient received 80 mg of Lasix IV in the emergency room 

and he gave good urine output. Then he was continued on Lasix 40 mg IV every 8 

hours with albumin 25% every 8 hours for 2 days. On third day I change his IV 

Lasix to oral torsemide 40 mg by mouth every 12 hours. Patient received 2 

doses. Also he was started on spell Aldactone 12.5 mg yesterday. His urine 

output in the last 24 hours is about 4 L. His weight decreased about 8-9 kg 

from admission. Patient did not have any acute events during hospitalization. 

Overnight he did not have any symptoms. He denies fever, chills, nausea, 

vomiting,chest pain, palpitation, heart racing, cough, hemoptysis, upper 

spirits symptoms, abdominal pain, diarrhea, constipation, blood in stool, black 

stool, urinary symptoms, any other symptoms or concerns. 





Today patient potassium, sodium, kidney function, WBC are all normal. His INR 

dropped to 1.6. Patient was advised to take extra 5 mg of warfarin today.





I am discharging patient on his home medications except Lasix and changing his 

torsemide from 40 mg twice a day to 30 mg twice a day and continue Aldactone 

12.5 mg daily. Patient was advised to see his primary care provider in 3-4 days 

and check for creatinine, potassium, INR and discussed with primary care 

provider his torsemide, potassium chloride and warfarin dosing.





- Discharge Data


Discharge Date: 12/17/17


Discharge Disposition: Home, Self-Care 01


Condition: Good





- Discharge Diagnosis/Problem(s)


(1) Acute on chronic systolic (congestive) heart failure


SNOMED Code(s): 917436727


   ICD Code: I50.23 - ACUTE ON CHRONIC SYSTOLIC (CONGESTIVE) HEART FAILURE   

Status: Acute   Priority: High   Current Visit: Yes   





(2) Hypoalbuminemia


SNOMED Code(s): 402648238


   ICD Code: E88.09 - [facility] DISORDERS OF PLASMA-PROTEIN METABOLISM, NEC   Status: 

Chronic   Current Visit: Yes   





(3) Thrombocytopenia


SNOMED Code(s): 485705195


   ICD Code: D69.6 - THROMBOCYTOPENIA, UNSPECIFIED   Status: Chronic   Current 

Visit: Yes   





(4) Essential hypertension


SNOMED Code(s): 60727471


   ICD Code: I10 - ESSENTIAL (PRIMARY) HYPERTENSION   Status: Chronic   Current 

Visit: Yes   





(5) Aortic valve disease


SNOMED Code(s): 1920919


   ICD Code: I35.9 - NONRHEUMATIC AORTIC VALVE DISORDER, UNSPECIFIED   Status: 

Chronic   Current Visit: Yes   





(6) Chronic atrial fibrillation


SNOMED Code(s): 825784904


   ICD Code: I48.2 - CHRONIC ATRIAL FIBRILLATION   Status: Chronic   Current 

Visit: Yes   





(7) Pleural effusion


SNOMED Code(s): 72344229


   ICD Code: J90 - PLEURAL EFFUSION, NOT ELSEWHERE CLASSIFIED   Status: Chronic

   Current Visit: Yes   





- Patient Instructions


Diet: Heart Healthy Diet


Fluid Restriction: 1500 mL


Activity: As Tolerated


Showering/Bathing: May Shower


Notify Provider of: Fever, Nausea and/or Vomiting





- Discharge Plan


Prescriptions/Med Rec: 


Spironolactone [Aldactone] 12.5 mg PO DAILY #30 tablet


Torsemide [Demadex] 30 mg PO Q12H #60 tablet


Home Medications: 


 Home Meds





Carvedilol 25 mg PO BID 06/03/15 [History]


Digoxin [Digox] 125 mcg PO DAILY 06/03/15 [History]


Lisinopril 40 mg PO DAILY 06/03/15 [History]


Methylcellulose (with Sugar) [Citrucel] 454 gm PO DAILY 12/14/17 [History]


Potassium Chloride [Klor-Con M20] 20 meq PO DAILY 12/14/17 [History]


Warfarin [Coumadin] 2.5 mg PO ASDIRECTED 12/14/17 [History]


Warfarin [Coumadin] 5 mg PO ASDIRECTED 12/14/17 [History]


Spironolactone [Aldactone] 12.5 mg PO DAILY #30 tablet 12/17/17 [Rx]


Torsemide [Demadex] 30 mg PO Q12H #60 tablet 12/17/17 [Rx]








Forms:  ED Department Discharge





- General Info


Date of Service: 12/17/17


Admission Dx/Problem (Free Text: 


 Admission Diagnosis/Problem





Admission Diagnosis/Problem      Acute congestive heart failure








Subjective Update: 


he is feeling much better better. His lower extremities edema and abdominal 

edema markedly improved. His shortness breath resolved. He denies chest pain, 

cough, palpitation, nausea, vomiting, fever, chills, any other symptoms or 

concerns.





- Patient Data


Vitals - Most Recent: 


 Last Vital Signs











Temp  36.6 C   12/17/17 07:00


 


Pulse  70   12/17/17 07:00


 


Resp  20   12/17/17 07:00


 


BP  126/67   12/17/17 07:00


 


Pulse Ox  95   12/17/17 07:00











Weight - Most Recent: 85.275 kg


I&O - Last 24 hours: 


 Intake & Output











 12/16/17 12/17/17 12/17/17





 22:59 06:59 14:59


 


Intake Total 461 525 


 


Output Total 1700 1700 


 


Balance -1239 -1175 











Lab Results - Last 24 hrs: 


 Laboratory Results - last 24 hr











  12/17/17 12/17/17 Range/Units





  05:50 05:50 


 


PT  16.2 H   (9.0-12.0)  SEC


 


INR  1.6 H   (0.9-1.2)  


 


Sodium   140  (135-145)  mmol/L


 


Potassium   3.7  (3.6-5.0)  mmol/L


 


Chloride   103  (101-111)  mmol/L


 


Carbon Dioxide   29.0  (21.0-31.0)  mmol/L


 


Anion Gap   11.7  


 


BUN   23 H  (7-18)  mg/dL


 


Creatinine   0.9  (0.6-1.3)  mg/dL


 


Est Cr Clr Drug Dosing   65.46  mL/min


 


Estimated GFR (MDRD)   > 60  


 


Glucose   92  ()  mg/dL


 


Calcium   8.5  (8.4-10.2)  mg/dl


 


Magnesium   2.0  (1.8-2.5)  mg/dL


 


B-Natriuretic Peptide   237 H  (0-100)  pg/ml











Med Orders - Current: 


 Current Medications





Acetaminophen (Tylenol)  650 mg PO Q4H PRN


   PRN Reason: Pain (Mild 1-3)/fever


Carvedilol (Coreg)  25 mg PO BID [facility]


   Last Admin: 12/16/17 22:06 Dose:  25 mg


Digoxin (Lanoxin)  125 mcg PO DAILY [facility]


   Last Admin: 12/16/17 10:27 Dose:  125 mcg


Lisinopril (Prinivil)  40 mg PO DAILY [facility]


   Last Admin: 12/16/17 10:29 Dose:  40 mg


Methylcellulose (Citrucel)  500 mg PO DAILY [facility]


   Last Admin: 12/16/17 10:24 Dose:  500 mg


Ondansetron HCl (Zofran)  4 mg IVPUSH Q6H PRN


   PRN Reason: Nausea/Vomiting


Polyethylene Glycol (Miralax)  17 gm PO DAILY PRN


   PRN Reason: Constipation


Potassium Chloride (Klor-Con 10)  10 meq PO Q12H [facility]


   Last Admin: 12/17/17 03:07 Dose:  10 meq


Sodium Chloride (Saline Flush)  10 ml FLUSH ASDIRECTED PRN


   PRN Reason: Keep Vein Open


   Last Admin: 12/16/17 21:59 Dose:  10 ml


Spironolactone (Aldactone)  12.5 mg PO DAILY [facility]


   Last Admin: 12/16/17 10:31 Dose:  12.5 mg


Torsemide (Demadex)  40 mg PO Q12H [facility]


   Last Admin: 12/17/17 03:06 Dose:  40 mg


Warfarin Sodium (Pharmacy To Dose - Warfarin)  1 dose .XX ASDIRECTED [facility]





Discontinued Medications





Furosemide (Lasix)  40 mg IVPUSH NOW ONE


   Stop: 12/14/17 20:22


   Last Admin: 12/14/17 20:31 Dose:  40 mg


Furosemide (Lasix)  40 mg IVPUSH NOW ONE


   Stop: 12/14/17 21:33


   Last Admin: 12/14/17 21:41 Dose:  40 mg


Furosemide (Lasix)  40 mg IVPUSH Q8H [facility]


   Last Admin: 12/16/17 06:20 Dose:  40 mg


Albumin Human (Flexbumin 25%)  12.5 gm in 50 mls @ 100 mls/hr IV Q8H [facility]


   Stop: 12/16/17 22:16


   Last Admin: 12/16/17 21:57 Dose:  100 mls/hr


Potassium Chloride (Klor-Con 10)  10 meq PO TIDMEALS [facility]


   Last Admin: 12/16/17 10:38 Dose:  Not Given


Potassium Chloride (Klor-Con 10)  10 meq PO ONETIME ONE


   Stop: 12/15/17 08:40


   Last Admin: 12/15/17 10:26 Dose:  10 meq


Torsemide (Demadex)  30 mg PO Q12H [facility]


   Stop: 12/16/17 14:00


Warfarin Sodium (Coumadin)  2.5 mg PO ASDIRECTED JOHNNIE


Warfarin Sodium (Coumadin)  5 mg PO ASDIRECTED JOHNNIE


Warfarin Sodium (Coumadin)  2.5 mg PO ONETIME ONE


   Stop: 12/15/17 14:01


   Last Admin: 12/15/17 14:13 Dose:  2.5 mg


Warfarin Sodium (Coumadin)  2.5 mg PO ONETIME ONE


   Stop: 12/16/17 14:01


   Last Admin: 12/16/17 14:59 Dose:  2.5 mg











- Exam


General: Reports: Alert, Oriented, Cooperative, No Acute Distress.  Denies: 

Mild Distress, Moderate Distress, Severe Distress, Sedated, Lethargic, Obtunded


HEENT: Reports: Pupils Equal, Pupils Reactive, EOMI, Mucous Membr. Moist/Pink


Neck: Reports: Supple, Trachea Midline, No JVD, No Thyromegaly


Lungs: Reports: Clear to Auscultation, Normal Respiratory Effort.  Denies: 

Crackles, Rales, Rhonchi, Wheezing


Cardiovascular: Reports: Regular Rate, Regular Rhythm.  Denies: No Murmurs


GI/Abdominal Exam: Normal Bowel Sounds, Soft, Non-Tender, No Organomegaly, No 

Distention, No Abnormal Bruit, No Mass


 (Male) Exam: Deferred


Rectal (Males) Exam: Deferred


Back Exam: Reports: Normal Inspection, Full Range of Motion.  Denies: CVA 

Tenderness (L), CVA Tenderness (R)


Extremities: Normal Inspection, Normal Range of Motion, Non-Tender, Normal 

Capillary Refill, Pedal Edema (+1 bilateral lower extremities edema)


Skin: Reports: Dry, Intact.  Denies: Rash, Ecchymosis


Neurological: Reports: No New Focal Deficit


Psy/Mental Status: Reports: Alert, Normal Affect, Normal Mood





*Q Meaningful Use (DIS)





- VTE *Q


VTE Criteria *Q: 








- Stroke *Q


Stroke Criteria *Q: 








- AMI *Q


AMI Criteria *Q:

## 2019-06-16 ENCOUNTER — HOSPITAL ENCOUNTER (EMERGENCY)
Dept: HOSPITAL 43 - DL.ED | Age: 84
Discharge: HOME | End: 2019-06-16
Payer: MEDICARE

## 2019-06-16 VITALS — DIASTOLIC BLOOD PRESSURE: 60 MMHG | SYSTOLIC BLOOD PRESSURE: 107 MMHG

## 2019-06-16 DIAGNOSIS — I50.9: ICD-10-CM

## 2019-06-16 DIAGNOSIS — R42: ICD-10-CM

## 2019-06-16 DIAGNOSIS — Z88.8: ICD-10-CM

## 2019-06-16 DIAGNOSIS — I48.2: ICD-10-CM

## 2019-06-16 DIAGNOSIS — I49.3: Primary | ICD-10-CM

## 2019-06-16 LAB
ANION GAP SERPL CALC-SCNC: 12.9 MMOL/L
CHLORIDE SERPL-SCNC: 105 MMOL/L (ref 101–111)
SODIUM SERPL-SCNC: 141 MMOL/L (ref 135–145)

## 2019-06-16 PROCEDURE — 36415 COLL VENOUS BLD VENIPUNCTURE: CPT

## 2019-06-16 PROCEDURE — 93005 ELECTROCARDIOGRAM TRACING: CPT

## 2019-06-16 PROCEDURE — 96365 THER/PROPH/DIAG IV INF INIT: CPT

## 2019-06-16 PROCEDURE — 83880 ASSAY OF NATRIURETIC PEPTIDE: CPT

## 2019-06-16 PROCEDURE — 71045 X-RAY EXAM CHEST 1 VIEW: CPT

## 2019-06-16 PROCEDURE — 85610 PROTHROMBIN TIME: CPT

## 2019-06-16 PROCEDURE — 85025 COMPLETE CBC W/AUTO DIFF WBC: CPT

## 2019-06-16 PROCEDURE — 84484 ASSAY OF TROPONIN QUANT: CPT

## 2019-06-16 PROCEDURE — 99285 EMERGENCY DEPT VISIT HI MDM: CPT

## 2019-06-16 PROCEDURE — 81003 URINALYSIS AUTO W/O SCOPE: CPT

## 2019-06-16 PROCEDURE — 80053 COMPREHEN METABOLIC PANEL: CPT

## 2019-06-16 NOTE — EDM.PDOC
ED HPI GENERAL MEDICAL PROBLEM





- General


Chief Complaint: Cardiovascular Problem


Stated Complaint: BP LOW [id number]


Time Seen by Provider: 06/16/19 07:11


Source of Information: Reports: Patient, RN, RN Notes Reviewed


History Limitations: Reports: No Limitations





- History of Present Illness


INITIAL COMMENTS - FREE TEXT/NARRATIVE: 


Pt to ER with c/o recent dizziness and "not feeling right". Patient states he 

passed out in the bathroom at [facility] on Friday. He states he was not having a 

BM. Patient states he has been doing more running than usual as his wife had 

surgery in [place], has been working outside. States he has been trying to 

drink enough water. States he takes a diuretic and has been urinating quite a 

bit. States his left lower leg is more swollen than usual. Patient denies any N/

V/D, chest pain, SOB, fever or chills. States hx of atrial fibrillation, on 

anticoagulants. 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 


Denies dizziness today. Presents a list of BP's taken at home. All systolically 

under 100 with the lowest being 84/54. States he has not been dizzy for a few 

days. 


Onset: Gradual





- Related Data


 Allergies











Allergy/AdvReac Type Severity Reaction Status Date / Time


 


aspartame Allergy  Hives Verified 06/16/19 07:16


 


peanut Allergy  Hives Verified 06/16/19 07:16











Home Meds: 


 Home Meds





Carvedilol 25 mg PO BID 06/03/15 [History]


Digoxin [Digox] 125 mcg PO DAILY 06/03/15 [History]


Lisinopril 40 mg PO DAILY 06/03/15 [History]


Methylcellulose (with Sugar) [Citrucel] 454 gm PO DAILY 12/14/17 [History]


Potassium Chloride [Klor-Con M20] 20 meq PO DAILY 12/14/17 [History]


Warfarin [Coumadin] 2.5 mg PO ASDIRECTED 12/14/17 [History]


Warfarin [Coumadin] 5 mg PO ASDIRECTED 12/14/17 [History]


Spironolactone [Aldactone] 12.5 mg PO DAILY #30 tablet 12/17/17 [Rx]


Torsemide [Demadex] 30 mg PO Q12H #60 tablet 12/17/17 [Rx]











Past Medical History


Cardiovascular History: Reports: Afib, Heart Failure


Gastrointestinal History: Reports: Irritable Bowel Syndrome


Musculoskeletal History: Reports: Arthritis


Neurological History: Reports: Other (See Below)


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


Oncologic (Cancer) History: Reports: Other (See Below)


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





- Past Surgical History


Cardiovascular Surgical History: Reports: Valve Replacement





Social & Family History





- Family History


Family Medical History: Noncontributory





- Tobacco Use


Smoking Status *Q: Never Smoker





- Caffeine Use


Caffeine Use: Reports: None





- Recreational Drug Use


Recreational Drug Use: No





- Living Situation & Occupation


Living situation: Reports: , with Spouse


Occupation: Retired





ED ROS GENERAL





- Review of Systems


Review Of Systems: ROS reveals no pertinent complaints other than HPI.





ED EXAM, GENERAL





- Physical Exam


Exam: See Below


Exam Limited By: No Limitations


General Appearance: Alert, WD/WN, No Apparent Distress


Eye Exam: Bilateral Eye: EOMI, Normal Inspection


Ears: Normal External Exam, Hearing Grossly Normal


Nose: Normal Inspection


Throat/Mouth: Normal Inspection, Normal Voice, No Airway Compromise


Head: Atraumatic, Normocephalic


Neck: Normal Inspection, Supple, Non-Tender, Full Range of Motion


Respiratory/Chest: No Respiratory Distress, No Accessory Muscle Use, Crackles (

left base)


Cardiovascular: Normal Peripheral Pulses, No JVD, No Murmur, No Rub, 

Irregularly Irregular (atrial fibrillation with PVC's)


Peripheral Pulses: 2+: Carotid (L), Carotid (R), Radial (L), Radial (R)


GI/Abdominal: Normal Bowel Sounds, Soft, Non-Tender, No Distention


 (Male) Exam: Deferred


Rectal (Males) Exam: Deferred


Back Exam: Normal Inspection, Full Range of Motion, NT


Extremities: Pedal Edema (left lower extremity +2 edema), Joint Swelling (left 

knee)


Neurological: Alert, Oriented, CN II-XII Intact, Normal Cognition, Normal Gait, 

Normal Reflexes, No Motor/Sensory Deficits


Psychiatric: Normal Affect, Normal Mood


Skin Exam: Warm, Dry, Intact, Normal Color, No Rash


Lymphatic: No Adenopathy





EKG INTERPRETATION


EKG Date: 06/16/19


Time: 07:38


Rhythm: A-Fib


Rate (Beats/Min): 84


Comparison: No Change





Course





- Vital Signs


Last Recorded V/S: 


 Last Vital Signs











Temp  96.8 F   06/16/19 07:04


 


Pulse  66   06/16/19 08:18


 


Resp  16   06/16/19 08:18


 


BP  107/60   06/16/19 08:18


 


Pulse Ox  92 L  06/16/19 08:18








 





Orthostatic Blood Pressure [     119/68


Standing]                        


Orthostatic Blood Pressure [     113/68


Sitting]                         


Orthostatic Blood Pressure [     111/61


Supine]                          











- Orders/Labs/Meds


Orders: 


 Active Orders 24 hr











 Category Date Time Status


 


 EKG Documentation Completion [RC] STAT Care  06/16/19 07:34 Active


 


 Peripheral IV Care [RC] .AS DIRECTED Care  06/16/19 07:36 Active


 


 Peripheral IV Insertion Adult [OM.PC] Stat Oth  06/16/19 07:34 Ordered











Labs: 


 Laboratory Tests











  06/16/19 06/16/19 06/16/19 Range/Units





  07:35 07:42 07:42 


 


WBC   7.8   (5.0-10.0)  10^3/uL


 


RBC   4.49 L   (4.6-6.2)  10^6/uL


 


Hgb   14.7   (14.0-18.0)  g/dL


 


Hct   44.3   (40.0-54.0)  %


 


MCV   98.7   ()  fL


 


MCH   32.7   (27.0-34.0)  pg


 


MCHC   33.2   (33.0-35.0)  g/dL


 


Plt Count   174   (150-450)  10^3/uL


 


Neut % (Auto)   65.0   (42.2-75.2)  %


 


Lymph % (Auto)   18.6 L   (20.5-50.1)  %


 


Mono % (Auto)   11.2 H   (2-8)  %


 


Eos % (Auto)   4.4 H   (1.0-3.0)  %


 


Baso % (Auto)   0.8   (0.0-1.0)  %


 


PT    21.5 H  (9.0-12.0)  SEC


 


INR    2.2 H  (0.9-1.2)  


 


Sodium     (135-145)  mmol/L


 


Potassium     (3.6-5.0)  mmol/L


 


Chloride     (101-111)  mmol/L


 


Carbon Dioxide     (21.0-31.0)  mmol/L


 


Anion Gap     


 


BUN     (7-18)  mg/dL


 


Creatinine     (0.6-1.3)  mg/dL


 


Est Cr Clr Drug Dosing     mL/min


 


Estimated GFR (MDRD)     


 


BUN/Creatinine Ratio     


 


Glucose     ()  mg/dL


 


Calcium     (8.4-10.2)  mg/dl


 


Total Bilirubin     (0.2-1.0)  mg/dL


 


AST     (10-42)  IU/L


 


ALT     (10-60)  IU/L


 


Alkaline Phosphatase     ()  IU/L


 


Troponin I     (0.00-0.02)  ng/ml


 


B-Natriuretic Peptide     (0-100)  pg/ml


 


Total Protein     (6.7-8.2)  g/dl


 


Albumin     (3.2-5.5)  g/dl


 


Globulin     


 


Albumin/Globulin Ratio     


 


Urine Color  Yellow    (YELLOW)  


 


Urine Appearance  Clear    (CLEAR)  


 


Urine pH  6.0    (5.0-9.0)  


 


Ur Specific Gravity  1.015    (1.005-1.030)  


 


Urine Protein  Negative    (NEGATIVE)  


 


Urine Glucose (UA)  Negative    (NEGATIVE)  


 


Urine Ketones  Negative    (NEGATIVE)  


 


Urine Occult Blood  Negative    (NEGATIVE)  


 


Urine Nitrite  Negative    (NEGATIVE)  


 


Urine Bilirubin  Negative    (NEGATIVE)  


 


Urine Urobilinogen  0.2    (0.2-1.0)  mg/dL


 


Ur Leukocyte Esterase  Negative    (NEGATIVE)  














  06/16/19 Range/Units





  07:42 


 


WBC   (5.0-10.0)  10^3/uL


 


RBC   (4.6-6.2)  10^6/uL


 


Hgb   (14.0-18.0)  g/dL


 


Hct   (40.0-54.0)  %


 


MCV   ()  fL


 


MCH   (27.0-34.0)  pg


 


MCHC   (33.0-35.0)  g/dL


 


Plt Count   (150-450)  10^3/uL


 


Neut % (Auto)   (42.2-75.2)  %


 


Lymph % (Auto)   (20.5-50.1)  %


 


Mono % (Auto)   (2-8)  %


 


Eos % (Auto)   (1.0-3.0)  %


 


Baso % (Auto)   (0.0-1.0)  %


 


PT   (9.0-12.0)  SEC


 


INR   (0.9-1.2)  


 


Sodium  141  (135-145)  mmol/L


 


Potassium  3.9  (3.6-5.0)  mmol/L


 


Chloride  105  (101-111)  mmol/L


 


Carbon Dioxide  27.0  (21.0-31.0)  mmol/L


 


Anion Gap  12.9  


 


BUN  21 H  (7-18)  mg/dL


 


Creatinine  0.9  (0.6-1.3)  mg/dL


 


Est Cr Clr Drug Dosing  62.28  mL/min


 


Estimated GFR (MDRD)  > 60  


 


BUN/Creatinine Ratio  23.33  


 


Glucose  106 H  ()  mg/dL


 


Calcium  7.8 L  (8.4-10.2)  mg/dl


 


Total Bilirubin  1.0  (0.2-1.0)  mg/dL


 


AST  35  (10-42)  IU/L


 


ALT  25  (10-60)  IU/L


 


Alkaline Phosphatase  111  ()  IU/L


 


Troponin I  0.03 H*  (0.00-0.02)  ng/ml


 


B-Natriuretic Peptide  211 H  (0-100)  pg/ml


 


Total Protein  5.0 L  (6.7-8.2)  g/dl


 


Albumin  2.6 L  (3.2-5.5)  g/dl


 


Globulin  2.4  


 


Albumin/Globulin Ratio  1.08  


 


Urine Color   (YELLOW)  


 


Urine Appearance   (CLEAR)  


 


Urine pH   (5.0-9.0)  


 


Ur Specific Gravity   (1.005-1.030)  


 


Urine Protein   (NEGATIVE)  


 


Urine Glucose (UA)   (NEGATIVE)  


 


Urine Ketones   (NEGATIVE)  


 


Urine Occult Blood   (NEGATIVE)  


 


Urine Nitrite   (NEGATIVE)  


 


Urine Bilirubin   (NEGATIVE)  


 


Urine Urobilinogen   (0.2-1.0)  mg/dL


 


Ur Leukocyte Esterase   (NEGATIVE)  











Meds: 


Medications














Discontinued Medications














Generic Name Dose Route Start Last Admin





  Trade Name Freq  PRN Reason Stop Dose Admin


 


Lactated Ringer's  1,000 mls @ 100 mls/hr  06/16/19 08:03  06/16/19 08:10





  Ringers, Lactated  IV  06/16/19 18:02  100 mls/hr





  .BOLUS ONE   Administration





     





     





     





     


 


Sodium Chloride  10 ml  06/16/19 07:34  06/16/19 08:08





  Saline Flush  FLUSH   10 ml





  ASDIRECTED PRN   Administration





  Keep Vein Open   





     





     





     














- Radiology Interpretation


Free Text/Narrative:: 


Chest xray:


FINDINGS:


Lungs: Unremarkable. No consolidation.


Pleural space: Unremarkable. No pleural effusion. No pneumothorax.


Heart/Mediastinum: The heart is enlarged.


Bones/joints: There are sternal wires consistent with previous sternotomy 

incision.


IMPRESSION:


No acute findings.


Thank you for allowing us to participate in the care of your patient.


Dictated and Authenticated by: Arnoldo Gaitan MD


06/16/2019 9:00 AM Central Time ([place] & [place])








See rad report








- Re-Assessments/Exams


Free Text/Narrative Re-Assessment/Exam: 





06/16/19 09:05


Discussed diagnostic study findings with the patient. Nothing "new or acute" 

found. Patient states understanding. BP has been good while in the ER. No 

hypotension noted. Patient states he is feeling well while in the ER. Discussed 

the possible need for a head CT to rule out recurrence of tumor. Patient states 

he will follow up with his primary care provider. He states he feels this does 

not need to be done at this time. Patient states he will return to the ER with 

any further problems. 





Departure





- Departure


Time of Disposition: 08:52


Disposition: Home, Self-Care 01


Condition: Fair


Clinical Impression: 


 Dizziness, PVC's (premature ventricular contractions)





Atrial fibrillation


Qualifiers:


 Atrial fibrillation type: chronic Qualified Code(s): I48.2 - Chronic atrial 

fibrillation





CHF (congestive heart failure)


Qualifiers:


 Heart failure type: unspecified Heart failure chronicity: chronic Qualified 

Code(s): I50.9 - Heart failure, unspecified





Instructions:  Near-Syncope, Easy-to-Read, Heart Failure, Easy-to-Read, 

Dizziness, Easy-to-Read, Edema, Easy-to-Read, Atrial Fibrillation, Easy-to-Read


Forms:  ED Department Discharge


Additional Instructions: 


Make an appointment to follow up with Dr. Mcclelland


Stay hydrated


Elevate legs when possible


Rest


Return to the ER with any further dizziness, symptoms, or problems. 





- My Orders


Last 24 Hours: 


My Active Orders





06/16/19 07:34


EKG Documentation Completion [RC] STAT 


Peripheral IV Insertion Adult [OM.PC] Stat 





06/16/19 07:36


Peripheral IV Care [RC] .AS DIRECTED 














- Assessment/Plan


Last 24 Hours: 


My Active Orders





06/16/19 07:34


EKG Documentation Completion [RC] STAT 


Peripheral IV Insertion Adult [OM.PC] Stat 





06/16/19 07:36


Peripheral IV Care [RC] .AS DIRECTED